# Patient Record
Sex: FEMALE | Race: WHITE
[De-identification: names, ages, dates, MRNs, and addresses within clinical notes are randomized per-mention and may not be internally consistent; named-entity substitution may affect disease eponyms.]

---

## 2017-10-17 ENCOUNTER — HOSPITAL ENCOUNTER (INPATIENT)
Dept: HOSPITAL 10 - L-D | Age: 27
LOS: 3 days | Discharge: HOME | End: 2017-10-20
Attending: SPECIALIST | Admitting: SPECIALIST
Payer: COMMERCIAL

## 2017-10-17 VITALS — RESPIRATION RATE: 20 BRPM | HEART RATE: 93 BPM | DIASTOLIC BLOOD PRESSURE: 74 MMHG | SYSTOLIC BLOOD PRESSURE: 118 MMHG

## 2017-10-17 VITALS
WEIGHT: 211.42 LBS | HEIGHT: 64 IN | HEIGHT: 64 IN | BODY MASS INDEX: 36.09 KG/M2 | BODY MASS INDEX: 36.09 KG/M2 | WEIGHT: 211.42 LBS

## 2017-10-17 DIAGNOSIS — Z3A.38: ICD-10-CM

## 2017-10-17 LAB
APTT BLD: 28.8 SEC (ref 25–35)
BASOPHILS # BLD AUTO: 0 10^3/UL (ref 0–0.1)
BASOPHILS NFR BLD: 0.4 % (ref 0–2)
EOSINOPHIL # BLD: 0.2 10^3/UL (ref 0–0.5)
EOSINOPHIL NFR BLD: 1.6 % (ref 0–7)
ERYTHROCYTE [DISTWIDTH] IN BLOOD BY AUTOMATED COUNT: 15.4 % (ref 11.5–14.5)
HCT VFR BLD CALC: 32.2 % (ref 37–47)
HGB BLD-MCNC: 10.4 G/DL (ref 12–16)
INR PPP: 0.98
LYMPHOCYTES # BLD AUTO: 2 10^3/UL (ref 0.8–2.9)
LYMPHOCYTES NFR BLD AUTO: 20.2 % (ref 15–51)
MCH RBC QN AUTO: 26.7 PG (ref 29–33)
MCHC RBC AUTO-ENTMCNC: 32.3 G/DL (ref 32–37)
MCV RBC AUTO: 82.6 FL (ref 82–101)
MONOCYTES # BLD: 0.7 10^3/UL (ref 0.3–0.9)
MONOCYTES NFR BLD: 7.1 % (ref 0–11)
NEUTROPHILS # BLD: 6.9 10^3/UL (ref 1.6–7.5)
NEUTROPHILS NFR BLD AUTO: 70.2 % (ref 39–77)
NRBC # BLD MANUAL: 0 10^3/UL (ref 0–0)
NRBC BLD AUTO-RTO: 0 /100WBC (ref 0–0)
PLATELET # BLD: 256 10^3/UL (ref 140–415)
PMV BLD AUTO: 12.4 FL (ref 7.4–10.4)
PROTHROMBIN TIME: 13 SEC (ref 12.2–14.2)
PT RATIO: 1
RBC # BLD AUTO: 3.9 10^6/UL (ref 4.2–5.4)
WBC # BLD AUTO: 9.8 10^3/UL (ref 4.8–10.8)

## 2017-10-17 PROCEDURE — 82962 GLUCOSE BLOOD TEST: CPT

## 2017-10-17 PROCEDURE — 85610 PROTHROMBIN TIME: CPT

## 2017-10-17 PROCEDURE — 86901 BLOOD TYPING SEROLOGIC RH(D): CPT

## 2017-10-17 PROCEDURE — 90715 TDAP VACCINE 7 YRS/> IM: CPT

## 2017-10-17 PROCEDURE — 90716 VAR VACCINE LIVE SUBQ: CPT

## 2017-10-17 PROCEDURE — 86592 SYPHILIS TEST NON-TREP QUAL: CPT

## 2017-10-17 PROCEDURE — 90686 IIV4 VACC NO PRSV 0.5 ML IM: CPT

## 2017-10-17 PROCEDURE — 85025 COMPLETE CBC W/AUTO DIFF WBC: CPT

## 2017-10-17 PROCEDURE — 87340 HEPATITIS B SURFACE AG IA: CPT

## 2017-10-17 PROCEDURE — 86900 BLOOD TYPING SEROLOGIC ABO: CPT

## 2017-10-17 PROCEDURE — 62319: CPT

## 2017-10-17 PROCEDURE — 85730 THROMBOPLASTIN TIME PARTIAL: CPT

## 2017-10-17 RX ADMIN — PYRIDOXINE HYDROCHLORIDE SCH MLS/HR: 100 INJECTION, SOLUTION INTRAMUSCULAR; INTRAVENOUS at 13:27

## 2017-10-17 RX ADMIN — POTASSIUM CHLORIDE SCH MLS/HR: 2 INJECTION, SOLUTION, CONCENTRATE INTRAVENOUS at 23:12

## 2017-10-17 RX ADMIN — Medication SCH MCG: at 13:27

## 2017-10-17 RX ADMIN — POTASSIUM CHLORIDE SCH MLS/HR: 2 INJECTION, SOLUTION, CONCENTRATE INTRAVENOUS at 15:19

## 2017-10-17 RX ADMIN — Medication SCH MCG: at 18:20

## 2017-10-17 RX ADMIN — PYRIDOXINE HYDROCHLORIDE SCH MLS/HR: 100 INJECTION, SOLUTION INTRAMUSCULAR; INTRAVENOUS at 11:27

## 2017-10-18 VITALS — RESPIRATION RATE: 18 BRPM | HEART RATE: 78 BPM | SYSTOLIC BLOOD PRESSURE: 146 MMHG | DIASTOLIC BLOOD PRESSURE: 72 MMHG

## 2017-10-18 VITALS — SYSTOLIC BLOOD PRESSURE: 117 MMHG | RESPIRATION RATE: 18 BRPM | HEART RATE: 79 BPM | DIASTOLIC BLOOD PRESSURE: 76 MMHG

## 2017-10-18 VITALS — DIASTOLIC BLOOD PRESSURE: 69 MMHG | RESPIRATION RATE: 19 BRPM | SYSTOLIC BLOOD PRESSURE: 120 MMHG | HEART RATE: 97 BPM

## 2017-10-18 PROCEDURE — 3E0P3VZ INTRODUCTION OF HORMONE INTO FEMALE REPRODUCTIVE, PERCUTANEOUS APPROACH: ICD-10-PCS | Performed by: SPECIALIST

## 2017-10-18 PROCEDURE — 0KQM0ZZ REPAIR PERINEUM MUSCLE, OPEN APPROACH: ICD-10-PCS | Performed by: SPECIALIST

## 2017-10-18 RX ADMIN — Medication SCH MCG: at 06:28

## 2017-10-18 RX ADMIN — Medication SCH MLS/HR: at 14:23

## 2017-10-18 RX ADMIN — PYRIDOXINE HYDROCHLORIDE SCH MLS/HR: 100 INJECTION, SOLUTION INTRAMUSCULAR; INTRAVENOUS at 22:23

## 2017-10-18 RX ADMIN — POTASSIUM CHLORIDE SCH MLS/HR: 2 INJECTION, SOLUTION, CONCENTRATE INTRAVENOUS at 07:43

## 2017-10-18 RX ADMIN — Medication SCH MLS/HR: at 18:04

## 2017-10-18 RX ADMIN — IBUPROFEN SCH MG: 600 TABLET ORAL at 17:06

## 2017-10-18 RX ADMIN — PYRIDOXINE HYDROCHLORIDE SCH MLS/HR: 100 INJECTION, SOLUTION INTRAMUSCULAR; INTRAVENOUS at 07:30

## 2017-10-18 RX ADMIN — POTASSIUM CHLORIDE SCH MLS/HR: 2 INJECTION, SOLUTION, CONCENTRATE INTRAVENOUS at 20:44

## 2017-10-18 RX ADMIN — PYRIDOXINE HYDROCHLORIDE SCH MLS/HR: 100 INJECTION, SOLUTION INTRAMUSCULAR; INTRAVENOUS at 20:44

## 2017-10-18 RX ADMIN — DOCUSATE SODIUM AND SENNOSIDES SCH TAB: 8.6; 5 TABLET, FILM COATED ORAL at 21:40

## 2017-10-18 RX ADMIN — PYRIDOXINE HYDROCHLORIDE SCH MLS/HR: 100 INJECTION, SOLUTION INTRAMUSCULAR; INTRAVENOUS at 08:48

## 2017-10-18 RX ADMIN — Medication SCH MCG: at 20:45

## 2017-10-18 RX ADMIN — Medication SCH MCG: at 02:09

## 2017-10-18 RX ADMIN — IBUPROFEN SCH MG: 600 TABLET ORAL at 23:52

## 2017-10-18 RX ADMIN — Medication SCH MCG: at 09:00

## 2017-10-18 NOTE — HP
Date/Time of Note


Date/Time of Note


DATE: 10/18/17 


TIME: 14:10





OB - History


Hx of Present Pregnancy


Free Text/Dictation


IUP at 38 weeks with late diagnosis of GDM due to patient being not compliant 

and traveling out of states


:  2


Para:  1


Prenatal Care:  Limited Care


Ultrasounds:  Normal mid trimester US, Abnormal US findings


Obstetrical Complications:  Gestational Diabetes


Medical Complications:  None





Past Family/Social History


*


Past Medical, Surgical, Family and Obstetric Histories reviewed from prenatal 

chart.





OB  Admission Exam


Vital Signs


Vital Signs





 Vital Signs








  Date Time  Temp Pulse Resp B/P Pulse Ox O2 Delivery O2 Flow Rate FiO2


 


10/17/17 11:20 98.2 93 20 118/74  Room Air  











Physical Exam


HEENT:  WNL


Heart:  Rhythm Normal


Lungs:  Clear, Equal


Abdomen:  WNL


Extremities:  Normal


Reflexes:  Normal


Last 72 hourBlood Glucose





Bedside Glucose - 72 Hours








Test


  10/17/17


14:25 10/17/17


19:26 10/17/17


23:10 10/18/17


01:24


 


Bedside Glucose


  73mg/dL


() 77mg/dL


() 65mg/dL


()  L 76mg/dL


()














Test


  10/18/17


03:37 10/18/17


07:35 10/18/17


11:55 


 


 


Bedside Glucose


  111mg/dL


() 71mg/dL


() 72mg/dL


() 


 








Last 72 hours Lab Results


 CBC & BMP


10/17/17 11:00











OB  Assessment/Plan


Other Assessment:


s/p 


Plan:  Expectant Management











DAKOTA WHITNEY MD Oct 18, 2017 14:12

## 2017-10-18 NOTE — LDN
Date/Time of Note


Date/Time of Note


DATE: 10/18/17 


TIME: 14:12





Delivery Summary


I arrived shortly after birth. delivery was done by laborist Dr. Cortez.


s/p  of viable male infant with APGARS 8/9. baby came out with placenta. 

the cord was double clamped and cut.


2nd degree perineal laceration was repaired in normal fashion.


placenta delivered intact and spontaneously


Placenta Delivered:  Spontaneously


Meconium:  none


Perineal laceration:  2


Laceration repair:  


repaired in normal


fashion


Anesthesia type:  Epidural


Sponge & Needle done & correct:  Yes


All needle counts correct:  Yes


Any foreign bodies felt in the:  No


Problems:  





Infant Delivery Information


Sex


Infant Sex:  male





Apgars


1 Minute:  8


5 Minute:  9





Suctioning


Nose & mouth suctioned at [place]:  Yes


Delee suction performed:  Yes





Umbilical Cord


Umbilical cord with:  3 Vessels


Cord presentations:  no nuchal cord


Cord Blood was obtained:  Yes





Mother & Baby Disposition


Disposition


Mom & Baby to Maternity; Good:  Yes











DAKOTA WHITNEY MD Oct 18, 2017 14:25

## 2017-10-19 VITALS — RESPIRATION RATE: 19 BRPM | DIASTOLIC BLOOD PRESSURE: 67 MMHG | HEART RATE: 97 BPM | SYSTOLIC BLOOD PRESSURE: 120 MMHG

## 2017-10-19 VITALS — DIASTOLIC BLOOD PRESSURE: 60 MMHG | HEART RATE: 94 BPM | SYSTOLIC BLOOD PRESSURE: 117 MMHG | RESPIRATION RATE: 20 BRPM

## 2017-10-19 VITALS — HEART RATE: 80 BPM | DIASTOLIC BLOOD PRESSURE: 57 MMHG | SYSTOLIC BLOOD PRESSURE: 109 MMHG | RESPIRATION RATE: 18 BRPM

## 2017-10-19 VITALS — HEART RATE: 99 BPM | SYSTOLIC BLOOD PRESSURE: 124 MMHG | RESPIRATION RATE: 19 BRPM | DIASTOLIC BLOOD PRESSURE: 72 MMHG

## 2017-10-19 VITALS — HEART RATE: 98 BPM | DIASTOLIC BLOOD PRESSURE: 62 MMHG | SYSTOLIC BLOOD PRESSURE: 126 MMHG | RESPIRATION RATE: 18 BRPM

## 2017-10-19 LAB
BASOPHILS # BLD AUTO: 0 10^3/UL (ref 0–0.1)
BASOPHILS NFR BLD: 0.3 % (ref 0–2)
EOSINOPHIL # BLD: 0.2 10^3/UL (ref 0–0.5)
EOSINOPHIL NFR BLD: 1.6 % (ref 0–7)
ERYTHROCYTE [DISTWIDTH] IN BLOOD BY AUTOMATED COUNT: 15.7 % (ref 11.5–14.5)
HCT VFR BLD CALC: 28.8 % (ref 37–47)
HGB BLD-MCNC: 8.9 G/DL (ref 12–16)
LYMPHOCYTES # BLD AUTO: 2.2 10^3/UL (ref 0.8–2.9)
LYMPHOCYTES NFR BLD AUTO: 17.8 % (ref 15–51)
MCH RBC QN AUTO: 25.5 PG (ref 29–33)
MCHC RBC AUTO-ENTMCNC: 30.9 G/DL (ref 32–37)
MCV RBC AUTO: 82.5 FL (ref 82–101)
MONOCYTES # BLD: 1 10^3/UL (ref 0.3–0.9)
MONOCYTES NFR BLD: 7.7 % (ref 0–11)
NEUTROPHILS # BLD: 9 10^3/UL (ref 1.6–7.5)
NEUTROPHILS NFR BLD AUTO: 71.9 % (ref 39–77)
NRBC # BLD MANUAL: 0 10^3/UL (ref 0–0)
NRBC BLD AUTO-RTO: 0 /100WBC (ref 0–0)
PLATELET # BLD: 194 10^3/UL (ref 140–415)
PMV BLD AUTO: 11.9 FL (ref 7.4–10.4)
RBC # BLD AUTO: 3.49 10^6/UL (ref 4.2–5.4)
WBC # BLD AUTO: 12.6 10^3/UL (ref 4.8–10.8)

## 2017-10-19 RX ADMIN — IBUPROFEN SCH MG: 600 TABLET ORAL at 17:34

## 2017-10-19 RX ADMIN — DOCUSATE SODIUM AND SENNOSIDES SCH TAB: 8.6; 5 TABLET, FILM COATED ORAL at 08:35

## 2017-10-19 RX ADMIN — IBUPROFEN SCH MG: 600 TABLET ORAL at 05:30

## 2017-10-19 RX ADMIN — DOCUSATE SODIUM AND SENNOSIDES SCH TAB: 8.6; 5 TABLET, FILM COATED ORAL at 21:00

## 2017-10-19 RX ADMIN — IBUPROFEN SCH MG: 600 TABLET ORAL at 11:42

## 2017-10-19 RX ADMIN — PYRIDOXINE HYDROCHLORIDE SCH MLS/HR: 100 INJECTION, SOLUTION INTRAMUSCULAR; INTRAVENOUS at 02:03

## 2017-10-19 NOTE — DS
Date/Time of Note


Date/Time of Note


DATE: 10/19/17 


TIME: 07:57





Obstetrical Discharge Record


Final Diagnosis


Final Diagnosis:  Term delivered





Vaginal Delivery


Obstetrical Delivery:  Spontaneous





Complications


Gestational Diabetes


Induction:  Yes





Condition on Discharge


Physical Assessment


Voiding:  Yes


Bowel Movement:  Yes


Breast:  Soft, non-tender, Filling


Fundus:  Firm


Abdomen and Incision:


soft, not tender


Calf Tenderness:  No


Patient Condition:  Good











DAKOTA WHITNEY MD Oct 19, 2017 07:57

## 2017-10-20 VITALS — HEART RATE: 95 BPM | DIASTOLIC BLOOD PRESSURE: 73 MMHG | SYSTOLIC BLOOD PRESSURE: 123 MMHG | RESPIRATION RATE: 19 BRPM

## 2017-10-20 VITALS — DIASTOLIC BLOOD PRESSURE: 71 MMHG | SYSTOLIC BLOOD PRESSURE: 120 MMHG | RESPIRATION RATE: 20 BRPM | HEART RATE: 93 BPM

## 2017-10-20 RX ADMIN — IBUPROFEN SCH MG: 600 TABLET ORAL at 05:38

## 2017-10-20 RX ADMIN — IBUPROFEN SCH MG: 600 TABLET ORAL at 11:56

## 2017-10-20 RX ADMIN — DOCUSATE SODIUM AND SENNOSIDES SCH TAB: 8.6; 5 TABLET, FILM COATED ORAL at 09:21

## 2017-10-20 RX ADMIN — IBUPROFEN SCH MG: 600 TABLET ORAL at 00:16

## 2018-01-08 ENCOUNTER — HOSPITAL ENCOUNTER (EMERGENCY)
Age: 28
Discharge: HOME | End: 2018-01-08

## 2018-01-08 ENCOUNTER — HOSPITAL ENCOUNTER (EMERGENCY)
Dept: HOSPITAL 91 - E/R | Age: 28
Discharge: HOME | End: 2018-01-08
Payer: COMMERCIAL

## 2018-01-08 DIAGNOSIS — A08.4: Primary | ICD-10-CM

## 2018-01-08 PROCEDURE — 99284 EMERGENCY DEPT VISIT MOD MDM: CPT

## 2019-04-06 ENCOUNTER — HOSPITAL ENCOUNTER (EMERGENCY)
Dept: HOSPITAL 91 - FTE | Age: 29
Discharge: HOME | End: 2019-04-06
Payer: MEDICAID

## 2019-04-06 ENCOUNTER — HOSPITAL ENCOUNTER (EMERGENCY)
Dept: HOSPITAL 10 - FTE | Age: 29
Discharge: HOME | End: 2019-04-06
Payer: COMMERCIAL

## 2019-04-06 VITALS — RESPIRATION RATE: 18 BRPM | HEART RATE: 88 BPM | SYSTOLIC BLOOD PRESSURE: 158 MMHG | DIASTOLIC BLOOD PRESSURE: 85 MMHG

## 2019-04-06 DIAGNOSIS — S33.5XXA: Primary | ICD-10-CM

## 2019-04-06 DIAGNOSIS — S13.9XXA: ICD-10-CM
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
## 2019-04-06 PROCEDURE — 72040 X-RAY EXAM NECK SPINE 2-3 VW: CPT

## 2019-04-06 PROCEDURE — 72100 X-RAY EXAM L-S SPINE 2/3 VWS: CPT

## 2019-04-06 PROCEDURE — 96372 THER/PROPH/DIAG INJ SC/IM: CPT

## 2019-04-06 PROCEDURE — 99284 EMERGENCY DEPT VISIT MOD MDM: CPT

## 2019-04-06 PROCEDURE — 81025 URINE PREGNANCY TEST: CPT

## 2019-04-06 RX ADMIN — KETOROLAC TROMETHAMINE 1 MG: 30 INJECTION, SOLUTION INTRAMUSCULAR at 19:07

## 2019-04-06 RX ADMIN — ONDANSETRON 1 MG: 4 TABLET, ORALLY DISINTEGRATING ORAL at 18:59

## 2019-04-06 RX ADMIN — HYDROCODONE BITARTRATE AND ACETAMINOPHEN 1 TAB: 5; 325 TABLET ORAL at 19:06
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
## 2019-07-26 ENCOUNTER — HOSPITAL ENCOUNTER (EMERGENCY)
Dept: HOSPITAL 10 - FTE | Age: 29
LOS: 1 days | Discharge: HOME | End: 2019-07-27
Payer: MEDICAID

## 2019-07-26 ENCOUNTER — HOSPITAL ENCOUNTER (EMERGENCY)
Dept: HOSPITAL 91 - FTE | Age: 29
LOS: 1 days | Discharge: HOME | End: 2019-07-27
Payer: MEDICAID

## 2019-07-26 VITALS
WEIGHT: 211.64 LBS | HEIGHT: 64 IN | WEIGHT: 211.64 LBS | BODY MASS INDEX: 36.13 KG/M2 | BODY MASS INDEX: 36.13 KG/M2 | HEIGHT: 64 IN
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
## 2019-07-26 DIAGNOSIS — S69.92XA: Primary | ICD-10-CM
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
## 2019-07-26 PROCEDURE — 96372 THER/PROPH/DIAG INJ SC/IM: CPT

## 2019-07-26 PROCEDURE — 73110 X-RAY EXAM OF WRIST: CPT

## 2019-07-26 PROCEDURE — 81025 URINE PREGNANCY TEST: CPT

## 2019-07-26 PROCEDURE — 29125 APPL SHORT ARM SPLINT STATIC: CPT

## 2019-07-26 PROCEDURE — 81003 URINALYSIS AUTO W/O SCOPE: CPT

## 2019-07-26 PROCEDURE — 99284 EMERGENCY DEPT VISIT MOD MDM: CPT

## 2019-07-27 VITALS — RESPIRATION RATE: 18 BRPM | HEART RATE: 77 BPM | SYSTOLIC BLOOD PRESSURE: 117 MMHG | DIASTOLIC BLOOD PRESSURE: 62 MMHG

## 2019-07-27 LAB
URINE BLOOD (DIP) POC: (no result)
URINE PH (DIP) POC: 7 (ref 5–8.5)
URINE TOTAL PROTEIN POC: (no result)

## 2019-07-27 RX ADMIN — KETOROLAC TROMETHAMINE 1 MG: 30 INJECTION, SOLUTION INTRAMUSCULAR at 02:10

## 2019-07-27 NOTE — ERD
ER Documentation


Chief Complaint


Chief Complaint





Left wrist pain after injury  states pain is getting worse





HPI


This is a 29-year-old female presents the emergency department with complaints 


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





LMP: 2019.   A0.





Denies headache, head injury, loss of consciousness, dizziness, neck pain, neck 


stiffness, throat pain, difficulty swallowing, difficulty breathing lying flat, 


shoulder pain, chest pain, back pain, abdominal pain, nausea, vomiting, 


constipation, diarrhea, urinary symptoms, pregnancy or possibility being 


pregnant, loss of bowel and bladder control, difficulty walking due to pain, 


numbness or tingling sensation, calf pain, recent travel, recent major surgery 


in the last 3 weeks, calf pain, recent long travel, recent exposure to any 


illness, recent antibiotic use in the last 3 months, fever, chills, seizures.





Past medical history: Left corneal transplant.


Surgical history: Left corneal transplant.


Social: Denies smoking, use of alcoholic beverages, use of illegal drugs.





ROS


All systems reviewed and are negative except as per history of present illness.





Medications


Home Meds


Active Scripts


Cyclobenzaprine Hcl* (Cyclobenzaprine Hcl*) 10 Mg Tablet, 10 MG PO TID PRN for 


MUSCLE SPASMS, #15 TAB


   Prov:CHRISTIAN SANDERS F         19


Ibuprofen* (Motrin*) 800 Mg Tab, 800 MG PO Q6H PRN for PAIN AND OR ELEVATED 


TEMP, #30 TAB


   Prov:LUCI SANDERSAR F         19


Acetaminophen with Codeine (Acetaminophen-Cod #3 Tablet) 1 Each Tablet, 1 TAB PO


Q6H, #10 TAB


   Prov:AARON GRANDE MD         19


Ibuprofen* (Motrin*) 600 Mg Tab, 600 MG PO Q6, #20 TAB


   Prov:AARON GRANDE MD         19


Dicyclomine HCl (Dicyclomine HCl) 10 Mg Capsule, 20 MG PO QID, #20 CAP


   Prov:ANGELA HOFFMAN NP         18


Ondansetron (Ondansetron Odt) 4 Mg Tab.rapdis, 4 MG PO Q6H PRN for NAUSEA AND/OR


VOMITING, #20 TAB


   Prov:ANGELA HOFFMAN NP         18


Acetaminophen* (Tylophen*) 500 Mg Capsule, 1 CAP PO Q6H PRN for PAIN AND OR 


ELEVATED TEMP, #20 CAP


   Prov:ANGELA HOFFMAN. NP         18


Ibuprofen* (Motrin*) 600 Mg Tab, 600 MG PO Q6H PRN for PAIN AND OR ELEVATED 


TEMP, #30 TAB


   Prov:ANGELA HOFFMAN. NP         18





Allergies


Allergies:  


Coded Allergies:  


     No Known Drug Allergies (Verified  Allergy, Unknown, 8/13/15)





PMhx/Soc


Medical and Surgical Hx:  pt denies Medical Hx, pt denies Surgical Hx


History of Surgery:  Yes (CORNEAL IMPLANT)


Anesthesia Reaction:  No


Hx Neurological Disorder:  No


Hx Respiratory Disorders:  No


Hx Cardiac Disorders:  No


Hx Psychiatric Problems:  No


Hx Miscellaneous Medical Probl:  No


Hx Alcohol Use:  No


Hx Substance Use:  No


Hx Tobacco Use:  No


Smoking Status:  Never smoker





Physical Exam


Vitals





Vital Signs


  Date      Temp  Pulse  Resp  B/P (MAP)   Pulse Ox  O2          O2 Flow    FiO2


Time                                                 Delivery    Rate


   19  98.1     77    18      117/62        98  Room Air


     03:31                           (80)


   19  97.8     87    18      138/75        99


     23:35                           (96)





Physical Exam


Const:   No acute distress


Head:   Atraumatic 


Eyes:    Normal Conjunctiva


ENT:    Normal External Ears, Nose and Mouth.


Neck:               Full range of motion. No meningismus.


Resp:   Clear to auscultation bilaterally


Cardio:   Regular rate and rhythm, no murmurs


Abd:    Soft, non tender, non distended. Normal bowel sounds


Skin:   No petechiae or rashes


Back:   No midline or flank tenderness.  C-spine/T-spine/L-spine are midline 


with good and full range of motion and is no swelling/deformity/bulging/point of


tenderness/midline tenderness.


Ext:    No cyanosis, or edema.  Left wrist: Mild swelling.  No obvious 


deformity.  Pain to range of motion.  Full range of motion.  Has good .  No


neurovascular deficit.  Left forearm: Unremarkable.  Left hand: Unremarkable.  


Left elbow: Unremarkable.  Left shoulder: Unremarkable.  Right upper extremity 


is unremarkable.


Neur:   Awake and alert


Psych:    Normal Mood and Affect


Results 24 hrs





Laboratory Tests


       Test
                                19
02:01  19
02:35


       POC Beta HCG, Qualitative            NEGATIVE


       Bedside Urine pH (LAB)                                       7.0


       Bedside Urine Protein (LAB)                                   1+


       Bedside Urine Glucose (UA)                          Negative


       Bedside Urine Ketones (LAB)                         Negative


       Bedside Urine Blood                                           2+


       Bedside Urine Nitrite (LAB)                         Negative


       Bedside Urine Leukocyte
Esterase (L  
              Negative 






Current Medications


 Medications
   Dose
          Sig/Dm
       Start Time
   Status  Last


 (Trade)       Ordered        Route
 PRN     Stop Time              Admin
Dose


                              Reason                                Admin


 Ketorolac
     30 mg          ONCE  STAT
    19       DC           19


Tromethamine
                 IM
            01:45
                       02:10



 (Toradol)                                   19 01:46








Procedures/MDM


Diagnostic tests:


POC urine pregnancy: Negative.


POC urine: Reviewed.


X-ray of the left wrist: No acute abnormality.





Treatment: Toradol IM.  Velcro splint.





Re-evaluation: No neurovascular deficit prior to and after the application of 


Velcro splint.  Stated that she feels much better this time and that she is 


ready to go home.  Stated that she is comfortable to go home.





Differential diagnosis


I have low suspicion for displaced fracture, compartment syndrome, open 


fracture.





Final diagnosis: Wrist sprain.





Prescription: Motrin.  Flexeril.





Follow-up with PCP in the next 24-48 hours.  Come back here in the emergency 


department for any new symptoms or any worsening symptoms.  





All questions and concerns were answered.  Patient and family members verbalized


understanding and agreed with plan of care.  





Hemodynamically stable on discharge.





Departure


Diagnosis:  


   Primary Impression:  


   Wrist injury


   Additional Impression:  


   Pain in wrist


Condition:  Stable





Additional Instructions:  


Follow-up with PCP in the next 24-48 hours.  Come back here in the emergency 


department for any new symptoms or any worsening symptoms.











CHRISTIAN SANDERS               2019 01:45